# Patient Record
Sex: MALE | Race: WHITE | NOT HISPANIC OR LATINO | Employment: OTHER | URBAN - METROPOLITAN AREA
[De-identification: names, ages, dates, MRNs, and addresses within clinical notes are randomized per-mention and may not be internally consistent; named-entity substitution may affect disease eponyms.]

---

## 2017-02-02 ENCOUNTER — OFFICE VISIT (OUTPATIENT)
Dept: ORTHOPEDIC SURGERY | Facility: CLINIC | Age: 77
End: 2017-02-02

## 2017-02-02 VITALS — WEIGHT: 189.6 LBS | HEIGHT: 69 IN | TEMPERATURE: 97.3 F | BODY MASS INDEX: 28.08 KG/M2

## 2017-02-02 DIAGNOSIS — M79.631 RIGHT FOREARM PAIN: ICD-10-CM

## 2017-02-02 DIAGNOSIS — M25.512 ACUTE PAIN OF LEFT SHOULDER: Primary | ICD-10-CM

## 2017-02-02 PROCEDURE — 73010 X-RAY EXAM OF SHOULDER BLADE: CPT | Performed by: ORTHOPAEDIC SURGERY

## 2017-02-02 PROCEDURE — 73030 X-RAY EXAM OF SHOULDER: CPT | Performed by: ORTHOPAEDIC SURGERY

## 2017-02-02 PROCEDURE — 99203 OFFICE O/P NEW LOW 30 MIN: CPT | Performed by: ORTHOPAEDIC SURGERY

## 2017-02-02 PROCEDURE — 73090 X-RAY EXAM OF FOREARM: CPT | Performed by: ORTHOPAEDIC SURGERY

## 2017-02-02 NOTE — PROGRESS NOTES
New Left Shoulder      Patient: Alfie Montgomery        YOB: 1940    Medical Record Number: [id number]        Chief Complaints: Left Shoulder Pain and Right forearm pain      History of Present Illness: This is a  76 y.o. male who presents with  complaints of left shoulder and right forearm pain.  The right forearm began in September of last year he was digging fence isn't moving concrete and had a pain in the dorsal aspect of his right forearm.  He does not recall one particular lift but it wasn't multiple things.  No night pain it's stiff in the morning though he's tried rest and occasional Mobic.  He is left-hand dominant.  With regard to the left shoulder he fell on January 21 landing on his left shoulder he had a slick area of fell in his had pain since that time he has times where he lives her shoulder gets, and then pops and he can lift that it sounds a lot like either labral tear or biceps tendon tear no prior history of similar symptoms.  He describes his symptoms as mild in the forearm moderate and intermittent in the shoulder stabbing clicking popping he's tried Mobic and rest he is retired her past medical history is remarkable for prostate cancer which is stable          Allergies:   Allergies   Allergen Reactions   • Other        Medications:   Home Medications:  Current Outpatient Prescriptions on File Prior to Visit   Medication Sig   • amLODIPine (NORVASC) 10 MG tablet Take  by mouth.   • chlorthalidone (HYGROTEN) 25 MG tablet Take  by mouth.   • colestipol (COLESTID) 1 G tablet Take  by mouth.   • lisinopril (PRINIVIL,ZESTRIL) 20 MG tablet Take  by mouth 2 (Two) Times a Day.   • meloxicam (MOBIC) 15 MG tablet Take  by mouth.   • raNITIdine (ZANTAC) 150 MG tablet    • simvastatin (ZOCOR) 40 MG tablet    • zolpidem (AMBIEN) 5 MG tablet Take  by mouth.     No current facility-administered medications on file prior to visit.      Current Medications:  Scheduled Meds:  Continuous  "Infusions:  No current facility-administered medications for this visit.   PRN Meds:.    Past Medical History   Diagnosis Date   • Hyperlipidemia    • Hypertension    • Prostate cancer         Past Surgical History   Procedure Laterality Date   • Knee surgery     • Shoulder surgery     • Prostate radioactive seed implant          Social History     Occupational History   • Not on file.     Social History Main Topics   • Smoking status: Never Smoker   • Smokeless tobacco: Not on file   • Alcohol use Yes   • Drug use: Not on file   • Sexual activity: Not on file    Social History     Social History Narrative        Family History   Problem Relation Age of Onset   • Hypertension Mother    • Heart attack Father    • Hypertension Maternal Grandmother              Review of Systems: 14 point review of systems are remarkable for the pertinent positives listed in chart by the patient the remainder are negative    Review of Systems      Physical Exam: 76 y.o. male  General Appearance:    Alert, cooperative, in no acute distress                 Vitals:    02/02/17 1129   Temp: 97.3 °F (36.3 °C)   TempSrc: Temporal Artery    Weight: 189 lb 9.6 oz (86 kg)   Height: 68.5\" (174 cm)      Patient is alert and read ×3 no acute distress appears her above-listed at height weight and age.  Affect is normal respiratory rate is normal unlabored. Heart rate regular rate rhythm, sclera, dentition and hearing are normal for the purpose of this exam.    Ortho Exam Physical exam of the left shoulder reveals no overlying skin changes no lymphedema no lymphadenopathy.  Patient has active flexion 180 with mild symptoms abduction is similar external rotation is to 50 and internal rotation to the upper lumbar spine with mild symptoms.  Patient has good rotator cuff strength 4+ over 5 with isometric strength testing with pain.  Patient has a positive impingement and a positive Alvarez sign.  Patient has good cervical range of motion which is full " "and asymptomatic no radicular symptoms.  Patient has a normal elbow exam.  Good distal pulses are presentPatient has pain with overhead activity and a positive Neer sign and a positive empty can sign  I do get apprehension with stretching/stressing of his biceps but I cannot get it to demonstrate the popping that he describes    Right form exam no effusion no redness he has no overlying skin changes he does have tenderness laterally over his lateral epicondyle  Procedures          Radiology:   AP, Scapular Y and Axillary Lateral of the left shoulder shoulder were ordered/reviewed to evauate shoulder pain.  I have no comparative films.  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 I see no acute bony pathology AP and lateral right forearm were taken he has a little bit of degenerative changes in his elbow these were taken to evaluate his pain have no comparative films I see no acute bony pathology    Assessment/Plan:  Right forearm pain or really think this is lateral epicondylitis I think he benefit from stretching he has a strap at home is given a do that as far as his left shoulder goes I'm concerned about his biceps be in the culprit of the \"popping I would like him to proceed with an MRI which she is agreeable to do he will follow-up after that test                          "

## 2017-02-07 ENCOUNTER — OFFICE VISIT (OUTPATIENT)
Dept: ORTHOPEDIC SURGERY | Facility: CLINIC | Age: 77
End: 2017-02-07

## 2017-02-07 DIAGNOSIS — M25.512 ACUTE PAIN OF LEFT SHOULDER: ICD-10-CM

## 2017-02-07 PROCEDURE — 73221 MRI JOINT UPR EXTREM W/O DYE: CPT | Performed by: ORTHOPAEDIC SURGERY

## 2017-02-10 DIAGNOSIS — M75.122 COMPLETE TEAR OF LEFT ROTATOR CUFF: Primary | ICD-10-CM

## 2017-02-16 ENCOUNTER — OFFICE VISIT (OUTPATIENT)
Dept: ORTHOPEDIC SURGERY | Facility: CLINIC | Age: 77
End: 2017-02-16

## 2017-02-16 VITALS — WEIGHT: 189.4 LBS | HEIGHT: 68 IN | BODY MASS INDEX: 28.7 KG/M2 | TEMPERATURE: 96.5 F

## 2017-02-16 DIAGNOSIS — M75.122 COMPLETE TEAR OF LEFT ROTATOR CUFF: Primary | ICD-10-CM

## 2017-02-16 PROCEDURE — 99213 OFFICE O/P EST LOW 20 MIN: CPT | Performed by: ORTHOPAEDIC SURGERY

## 2017-02-16 RX ORDER — ZOLPIDEM TARTRATE 10 MG/1
TABLET ORAL
COMMUNITY
Start: 2017-02-08 | End: 2017-02-27 | Stop reason: SDUPTHER

## 2017-02-16 NOTE — PROGRESS NOTES
"Left Shoulder MRI Follow Up      Patient: Alfie Montgomery        YOB: 1940            Chief Complaints: Left Shoulder pain  Chief Complaint   Patient presents with   • Left Shoulder - Follow-up           History of Present Illness: The patient is here follow-up of an MRI of the shoulder MRI shows a small full-thickness tear of the rotator cuff and anterior long head of the biceps.  I've spoken with him on the phone or at least he got results he wanted to proceed with surgery so it is on the schedule he is here for preop discussion      Physical Exam: 77 y.o. male  General Appearance:    Alert, cooperative, in no acute distress                   Vitals:    02/16/17 0935   Temp: 96.5 °F (35.8 °C)   TempSrc: Temporal Artery    Weight: 189 lb 6.4 oz (85.9 kg)   Height: 68\" (172.7 cm)        Patient is alert and read ×3 no acute distress appears her above-listed at height weight and age.  Affect is normal respiratory rate is normal unlabored. Heart rate regular rate rhythm, sclera, dentition and hearing are normal for the purpose of this exam.      Ortho Exam  Physical exam of the left shoulder reveals no overlying skin changes no lymphedema no lymphadenopathy.  Patient has active flexion 180 with mild symptoms abduction is similar external rotation is to 50 and internal rotation to the upper lumbar spine with mild symptoms.  Patient has good rotator cuff strength 4+ over 5 with isometric strength testing with pain.  Patient has a positive impingement and a positive Alvarez sign.  Patient has good cervical range of motion which is full and asymptomatic no radicular symptoms.  Patient has a normal elbow exam.  Good distal pulses are presentPatient has pain with overhead activity and a positive Neer sign and a positive empty can sign    Physical Exam: 77 y.o. male  General Appearance:    Alert, cooperative, in no acute distress                      Vitals:    02/16/17 0935   Temp: 96.5 °F (35.8 °C) " "  TempSrc: Temporal Artery    Weight: 189 lb 6.4 oz (85.9 kg)   Height: 68\" (172.7 cm)        Head:    Normocephalic, without obvious abnormality, atraumatic   Eyes:            conjunctivae and sclerae normal, no pallor, corneas clear,    Ears:    Ears appear intact with no abnormalities noted   Throat:   No oral lesions, no thrush, oral mucosa moist   Neck:   No adenopathy, supple, trachea midline, no thyromegaly,    Back:     No kyphosis present, no scoliosis present, no skin lesions,      erythema or scars, no tenderness to percussion or                   palpation,   range of motion normal   Lungs:     Clear to auscultation,respirations regular, even and                  unlabored    Heart:    Regular rhythm and normal rate               Chest Wall:    No abnormalities observed       Rectal:     Deferred   Extremities:    Moves all extremities well, no edema,   no cyanosis, no redness   Pulses:   Pulses palpable and equal bilaterally   Skin:   No bleeding, bruising or rash   Lymph nodes:   No palpable adenopathy   Neurologic:   Appears neurologic intact       MRI Results: As above  Procedures      Assessment/Plan:  Left shoulder pain with a fully extend the rotator cuff.  He is quite adamant he wants to proceed with fixing this.  He is done his research we did discuss risks benefits and alternatives.The patient voiced understanding of the risks, benefits, and alternative forms of treatment that were discussed and the patient consents to proceed with the above listed surgery.  All risks, benefits and alternatives were discussed.  Risks including to but not exclusive to anesthetic complications, including death, MI, CVA, infection, bleeding DVT, fracture, residual pain and need for future surgery.    He understands all these and wishes to proceed.  He is actually on the schedule he understands his perioperative regimen                "

## 2017-02-27 ENCOUNTER — APPOINTMENT (OUTPATIENT)
Dept: PREADMISSION TESTING | Facility: HOSPITAL | Age: 77
End: 2017-02-27

## 2017-02-27 VITALS
WEIGHT: 191 LBS | OXYGEN SATURATION: 97 % | RESPIRATION RATE: 16 BRPM | HEIGHT: 68 IN | TEMPERATURE: 97.1 F | HEART RATE: 52 BPM | SYSTOLIC BLOOD PRESSURE: 124 MMHG | BODY MASS INDEX: 28.95 KG/M2 | DIASTOLIC BLOOD PRESSURE: 71 MMHG

## 2017-02-27 LAB
ANION GAP SERPL CALCULATED.3IONS-SCNC: 13.3 MMOL/L
BUN BLD-MCNC: 26 MG/DL (ref 8–23)
BUN/CREAT SERPL: 22.2 (ref 7–25)
CALCIUM SPEC-SCNC: 8.9 MG/DL (ref 8.6–10.5)
CHLORIDE SERPL-SCNC: 100 MMOL/L (ref 98–107)
CO2 SERPL-SCNC: 25.7 MMOL/L (ref 22–29)
CREAT BLD-MCNC: 1.17 MG/DL (ref 0.76–1.27)
DEPRECATED RDW RBC AUTO: 44.2 FL (ref 37–54)
ERYTHROCYTE [DISTWIDTH] IN BLOOD BY AUTOMATED COUNT: 13.4 % (ref 11.5–14.5)
GFR SERPL CREATININE-BSD FRML MDRD: 61 ML/MIN/1.73
GLUCOSE BLD-MCNC: 113 MG/DL (ref 65–99)
HCT VFR BLD AUTO: 37.9 % (ref 40.4–52.2)
HGB BLD-MCNC: 13.2 G/DL (ref 13.7–17.6)
MCH RBC QN AUTO: 31.7 PG (ref 27–32.7)
MCHC RBC AUTO-ENTMCNC: 34.8 G/DL (ref 32.6–36.4)
MCV RBC AUTO: 90.9 FL (ref 79.8–96.2)
PLATELET # BLD AUTO: 232 10*3/MM3 (ref 140–500)
PMV BLD AUTO: 9.9 FL (ref 6–12)
POTASSIUM BLD-SCNC: 4 MMOL/L (ref 3.5–5.2)
RBC # BLD AUTO: 4.17 10*6/MM3 (ref 4.6–6)
SODIUM BLD-SCNC: 139 MMOL/L (ref 136–145)
WBC NRBC COR # BLD: 4.34 10*3/MM3 (ref 4.5–10.7)

## 2017-02-27 PROCEDURE — 36415 COLL VENOUS BLD VENIPUNCTURE: CPT

## 2017-02-27 PROCEDURE — 85027 COMPLETE CBC AUTOMATED: CPT | Performed by: ORTHOPAEDIC SURGERY

## 2017-02-27 PROCEDURE — 80048 BASIC METABOLIC PNL TOTAL CA: CPT | Performed by: ORTHOPAEDIC SURGERY

## 2017-02-27 RX ORDER — ALOSETRON HYDROCHLORIDE 0.5 MG/1
0.5 TABLET, FILM COATED ORAL 2 TIMES DAILY
COMMUNITY

## 2017-03-06 ENCOUNTER — ANESTHESIA EVENT (OUTPATIENT)
Dept: PERIOP | Facility: HOSPITAL | Age: 77
End: 2017-03-06

## 2017-03-06 ENCOUNTER — HOSPITAL ENCOUNTER (OUTPATIENT)
Facility: HOSPITAL | Age: 77
Setting detail: HOSPITAL OUTPATIENT SURGERY
Discharge: HOME | End: 2017-03-06
Attending: ORTHOPAEDIC SURGERY | Admitting: ORTHOPAEDIC SURGERY

## 2017-03-06 ENCOUNTER — ANESTHESIA (OUTPATIENT)
Dept: PERIOP | Facility: HOSPITAL | Age: 77
End: 2017-03-06

## 2017-03-06 VITALS
DIASTOLIC BLOOD PRESSURE: 77 MMHG | RESPIRATION RATE: 16 BRPM | SYSTOLIC BLOOD PRESSURE: 133 MMHG | OXYGEN SATURATION: 95 % | TEMPERATURE: 98 F | HEART RATE: 61 BPM

## 2017-03-06 PROCEDURE — 25010000002 FENTANYL CITRATE (PF) 100 MCG/2ML SOLUTION: Performed by: ANESTHESIOLOGY

## 2017-03-06 PROCEDURE — 25010000002 DEXAMETHASONE PER 1 MG: Performed by: NURSE ANESTHETIST, CERTIFIED REGISTERED

## 2017-03-06 PROCEDURE — 25010000002 MIDAZOLAM PER 1 MG: Performed by: ANESTHESIOLOGY

## 2017-03-06 PROCEDURE — 25010000002 ROPIVACAINE PER 1 MG: Performed by: ANESTHESIOLOGY

## 2017-03-06 PROCEDURE — 25010000003 CEFAZOLIN IN DEXTROSE 2-4 GM/100ML-% SOLUTION: Performed by: ORTHOPAEDIC SURGERY

## 2017-03-06 PROCEDURE — 25010000002 ONDANSETRON PER 1 MG: Performed by: NURSE ANESTHETIST, CERTIFIED REGISTERED

## 2017-03-06 PROCEDURE — C1713 ANCHOR/SCREW BN/BN,TIS/BN: HCPCS | Performed by: ORTHOPAEDIC SURGERY

## 2017-03-06 PROCEDURE — 29822 SHO ARTHRS SRG LMTD DBRDMT: CPT | Performed by: ORTHOPAEDIC SURGERY

## 2017-03-06 PROCEDURE — 23410 REPAIR ROTATOR CUFF ACUTE: CPT | Performed by: ORTHOPAEDIC SURGERY

## 2017-03-06 PROCEDURE — 25010000002 EPINEPHRINE PER 0.1 MG: Performed by: ORTHOPAEDIC SURGERY

## 2017-03-06 PROCEDURE — 25010000002 PROPOFOL 10 MG/ML EMULSION: Performed by: NURSE ANESTHETIST, CERTIFIED REGISTERED

## 2017-03-06 RX ORDER — MIDAZOLAM HYDROCHLORIDE 1 MG/ML
1 INJECTION INTRAMUSCULAR; INTRAVENOUS
Status: DISCONTINUED | OUTPATIENT
Start: 2017-03-06 | End: 2017-03-06 | Stop reason: HOSPADM

## 2017-03-06 RX ORDER — SODIUM CHLORIDE, SODIUM LACTATE, POTASSIUM CHLORIDE, CALCIUM CHLORIDE 600; 310; 30; 20 MG/100ML; MG/100ML; MG/100ML; MG/100ML
9 INJECTION, SOLUTION INTRAVENOUS CONTINUOUS
Status: DISCONTINUED | OUTPATIENT
Start: 2017-03-06 | End: 2017-03-06 | Stop reason: HOSPADM

## 2017-03-06 RX ORDER — LIDOCAINE HYDROCHLORIDE 10 MG/ML
5 INJECTION, SOLUTION EPIDURAL; INFILTRATION; INTRACAUDAL; PERINEURAL ONCE
Status: DISCONTINUED | OUTPATIENT
Start: 2017-03-06 | End: 2017-03-06 | Stop reason: HOSPADM

## 2017-03-06 RX ORDER — FENTANYL CITRATE 50 UG/ML
50 INJECTION, SOLUTION INTRAMUSCULAR; INTRAVENOUS
Status: DISCONTINUED | OUTPATIENT
Start: 2017-03-06 | End: 2017-03-06 | Stop reason: HOSPADM

## 2017-03-06 RX ORDER — GLYCOPYRROLATE 0.2 MG/ML
INJECTION INTRAMUSCULAR; INTRAVENOUS AS NEEDED
Status: DISCONTINUED | OUTPATIENT
Start: 2017-03-06 | End: 2017-03-06 | Stop reason: SURG

## 2017-03-06 RX ORDER — HYDROCODONE BITARTRATE AND ACETAMINOPHEN 5; 325 MG/1; MG/1
1 TABLET ORAL ONCE AS NEEDED
Status: DISCONTINUED | OUTPATIENT
Start: 2017-03-06 | End: 2017-03-06 | Stop reason: HOSPADM

## 2017-03-06 RX ORDER — SODIUM CHLORIDE 0.9 % (FLUSH) 0.9 %
1-10 SYRINGE (ML) INJECTION AS NEEDED
Status: DISCONTINUED | OUTPATIENT
Start: 2017-03-06 | End: 2017-03-06 | Stop reason: HOSPADM

## 2017-03-06 RX ORDER — ONDANSETRON 4 MG/1
4 TABLET, FILM COATED ORAL EVERY 8 HOURS PRN
Qty: 20 TABLET | Refills: 0 | Status: SHIPPED | OUTPATIENT
Start: 2017-03-06 | End: 2017-05-22

## 2017-03-06 RX ORDER — LABETALOL HYDROCHLORIDE 5 MG/ML
5 INJECTION, SOLUTION INTRAVENOUS
Status: DISCONTINUED | OUTPATIENT
Start: 2017-03-06 | End: 2017-03-06 | Stop reason: HOSPADM

## 2017-03-06 RX ORDER — OXYCODONE AND ACETAMINOPHEN 7.5; 325 MG/1; MG/1
TABLET ORAL
Qty: 60 TABLET | Refills: 0 | Status: SHIPPED | OUTPATIENT
Start: 2017-03-06 | End: 2017-03-17 | Stop reason: SDUPTHER

## 2017-03-06 RX ORDER — CEFAZOLIN SODIUM 2 G/100ML
2 INJECTION, SOLUTION INTRAVENOUS ONCE
Status: COMPLETED | OUTPATIENT
Start: 2017-03-06 | End: 2017-03-06

## 2017-03-06 RX ORDER — DIPHENHYDRAMINE HYDROCHLORIDE 50 MG/ML
6.25 INJECTION INTRAMUSCULAR; INTRAVENOUS
Status: DISCONTINUED | OUTPATIENT
Start: 2017-03-06 | End: 2017-03-06 | Stop reason: HOSPADM

## 2017-03-06 RX ORDER — LIDOCAINE HYDROCHLORIDE 20 MG/ML
INJECTION, SOLUTION INFILTRATION; PERINEURAL AS NEEDED
Status: DISCONTINUED | OUTPATIENT
Start: 2017-03-06 | End: 2017-03-06 | Stop reason: SURG

## 2017-03-06 RX ORDER — ONDANSETRON 2 MG/ML
INJECTION INTRAMUSCULAR; INTRAVENOUS AS NEEDED
Status: DISCONTINUED | OUTPATIENT
Start: 2017-03-06 | End: 2017-03-06 | Stop reason: SURG

## 2017-03-06 RX ORDER — ROPIVACAINE HYDROCHLORIDE 2 MG/ML
INJECTION, SOLUTION EPIDURAL; INFILTRATION; PERINEURAL AS NEEDED
Status: DISCONTINUED | OUTPATIENT
Start: 2017-03-06 | End: 2017-03-06 | Stop reason: SURG

## 2017-03-06 RX ORDER — DEXAMETHASONE SODIUM PHOSPHATE 10 MG/ML
INJECTION INTRAMUSCULAR; INTRAVENOUS AS NEEDED
Status: DISCONTINUED | OUTPATIENT
Start: 2017-03-06 | End: 2017-03-06 | Stop reason: SURG

## 2017-03-06 RX ORDER — ACETAMINOPHEN 325 MG/1
650 TABLET ORAL ONCE
Status: COMPLETED | OUTPATIENT
Start: 2017-03-06 | End: 2017-03-06

## 2017-03-06 RX ORDER — PROPOFOL 10 MG/ML
VIAL (ML) INTRAVENOUS AS NEEDED
Status: DISCONTINUED | OUTPATIENT
Start: 2017-03-06 | End: 2017-03-06 | Stop reason: SURG

## 2017-03-06 RX ORDER — HYDRALAZINE HYDROCHLORIDE 20 MG/ML
5 INJECTION INTRAMUSCULAR; INTRAVENOUS
Status: DISCONTINUED | OUTPATIENT
Start: 2017-03-06 | End: 2017-03-06 | Stop reason: HOSPADM

## 2017-03-06 RX ORDER — OXYCODONE HYDROCHLORIDE AND ACETAMINOPHEN 5; 325 MG/1; MG/1
2 TABLET ORAL ONCE AS NEEDED
Status: DISCONTINUED | OUTPATIENT
Start: 2017-03-06 | End: 2017-03-06 | Stop reason: HOSPADM

## 2017-03-06 RX ORDER — FAMOTIDINE 10 MG/ML
20 INJECTION, SOLUTION INTRAVENOUS ONCE
Status: COMPLETED | OUTPATIENT
Start: 2017-03-06 | End: 2017-03-06

## 2017-03-06 RX ORDER — ONDANSETRON 2 MG/ML
4 INJECTION INTRAMUSCULAR; INTRAVENOUS ONCE AS NEEDED
Status: DISCONTINUED | OUTPATIENT
Start: 2017-03-06 | End: 2017-03-06 | Stop reason: HOSPADM

## 2017-03-06 RX ORDER — MIDAZOLAM HYDROCHLORIDE 1 MG/ML
2 INJECTION INTRAMUSCULAR; INTRAVENOUS
Status: DISCONTINUED | OUTPATIENT
Start: 2017-03-06 | End: 2017-03-06 | Stop reason: HOSPADM

## 2017-03-06 RX ADMIN — ONDANSETRON 4 MG: 2 INJECTION INTRAMUSCULAR; INTRAVENOUS at 09:43

## 2017-03-06 RX ADMIN — ACETAMINOPHEN 325 MG: 325 TABLET ORAL at 06:48

## 2017-03-06 RX ADMIN — SODIUM CHLORIDE, POTASSIUM CHLORIDE, SODIUM LACTATE AND CALCIUM CHLORIDE 9 ML/HR: 600; 310; 30; 20 INJECTION, SOLUTION INTRAVENOUS at 06:28

## 2017-03-06 RX ADMIN — GLYCOPYRROLATE 0.2 MG: 0.2 INJECTION INTRAMUSCULAR; INTRAVENOUS at 08:25

## 2017-03-06 RX ADMIN — ROPIVACAINE HYDROCHLORIDE 20 ML: 2 INJECTION, SOLUTION EPIDURAL; INFILTRATION at 07:37

## 2017-03-06 RX ADMIN — FENTANYL CITRATE 25 MCG: 50 INJECTION INTRAMUSCULAR; INTRAVENOUS at 06:52

## 2017-03-06 RX ADMIN — CEFAZOLIN SODIUM 2 G: 2 INJECTION, SOLUTION INTRAVENOUS at 08:20

## 2017-03-06 RX ADMIN — PROPOFOL 200 MG: 10 INJECTION, EMULSION INTRAVENOUS at 08:19

## 2017-03-06 RX ADMIN — EPHEDRINE SULFATE 10 MG: 50 INJECTION INTRAMUSCULAR; INTRAVENOUS; SUBCUTANEOUS at 08:52

## 2017-03-06 RX ADMIN — FAMOTIDINE 20 MG: 10 INJECTION, SOLUTION INTRAVENOUS at 06:56

## 2017-03-06 RX ADMIN — DEXAMETHASONE SODIUM PHOSPHATE 8 MG: 10 INJECTION INTRAMUSCULAR; INTRAVENOUS at 08:35

## 2017-03-06 RX ADMIN — LIDOCAINE HYDROCHLORIDE 80 MG: 20 INJECTION, SOLUTION INFILTRATION; PERINEURAL at 08:19

## 2017-03-06 RX ADMIN — ROPIVACAINE HYDROCHLORIDE 6 ML/HR: 2 INJECTION, SOLUTION EPIDURAL; INFILTRATION at 10:30

## 2017-03-06 RX ADMIN — MIDAZOLAM 2 MG: 1 INJECTION INTRAMUSCULAR; INTRAVENOUS at 06:52

## 2017-03-06 NOTE — H&P
History & Physical       Patient: Alfie Montgomery    Date of Admission: No admission date for patient encounter.    YOB: 1940    Medical Record Number: [id number]    Attending Physician: Isela Bernardo MD        Chief Complaints: Complete tear of left rotator cuff [M75.122]      History of Present Illness: This is a 77-year-old gentleman who had a fall in January and sustained a rotator cuff tear.  His MRI shows a tear as well as a torn biceps tendon complex labral tear and some degenerative changes seen at the glenohumeral and acromioclavicular joint.  He presents for arthroscopy possible rotator cuff repair     Allergies: No Known Allergies    Medications:   Home Medications:  No current facility-administered medications on file prior to encounter.      Current Outpatient Prescriptions on File Prior to Encounter   Medication Sig   • amLODIPine (NORVASC) 10 MG tablet Take 10 mg by mouth Every Evening.   • chlorthalidone (HYGROTEN) 25 MG tablet Take 25 mg by mouth Daily.   • colestipol (COLESTID) 1 G tablet Take 1 g by mouth 3 (Three) Times a Day.   • lisinopril (PRINIVIL,ZESTRIL) 20 MG tablet Take 20 mg by mouth 2 (Two) Times a Day.   • meloxicam (MOBIC) 15 MG tablet Take 15 mg by mouth Daily. HOLD PRIOR TO SURGERY   • raNITIdine (ZANTAC) 150 MG tablet Take 150 mg by mouth 2 (Two) Times a Day.   • simvastatin (ZOCOR) 40 MG tablet Take 40 mg by mouth Every Night.   • zolpidem (AMBIEN) 5 MG tablet Take 5 mg by mouth At Night As Needed.     Current Medications:  Scheduled Meds:  Continuous Infusions:  No current facility-administered medications for this encounter.   PRN Meds:.    Past Medical History   Diagnosis Date   • Acid reflux    • Hyperlipidemia    • Hypertension    • IBS (irritable bowel syndrome)    • Prostate cancer      HISTORY        Past Surgical History   Procedure Laterality Date   • Shoulder surgery     • Prostate radioactive seed implant     • Knee arthroplasty Right    • Knee  arthroplasty Left    • Knee arthroscopy Left    • Knee arthroscopy Right    • Shoulder arthroscopy Right      CALCIUM DEPOSIT REMOVED   • Tonsillectomy     • Cataract extraction w/ intraocular lens implant Bilateral         Social History     Occupational History   • Not on file.     Social History Main Topics   • Smoking status: Current Every Day Smoker     Years: 2.00     Types: Pipe   • Smokeless tobacco: Not on file      Comment: DURING COLLEGE YEARS   • Alcohol use 3.6 oz/week     4 Cans of beer, 2 Glasses of wine per week   • Drug use: No   • Sexual activity: Defer    Social History     Social History Narrative        Family History   Problem Relation Age of Onset   • Hypertension Mother    • Heart attack Father    • Hypertension Maternal Grandmother        Review of Systems      Physical Exam: 77 y.o. male  General Appearance:    Alert, cooperative, in no acute distress                    There were no vitals filed for this visit.     Head:    Normocephalic, without obvious abnormality, atraumatic   Eyes:            conjunctivae and sclerae normal, no pallor, corneas clear,    Ears:    Ears appear intact with no abnormalities noted   Throat:   No oral lesions, no thrush, oral mucosa moist   Neck:   No adenopathy, supple, trachea midline, no thyromegaly,    Back:     No kyphosis present, no scoliosis present, no skin lesions,      erythema or scars, no tenderness to percussion or                   palpation,   range of motion normal   Lungs:     Clear to auscultation,respirations regular, even and                  unlabored    Heart:    Regular rhythm and normal rate               Chest Wall:    No abnormalities observed   Abdomen:     Normal bowel sounds, no masses, no organomegaly, soft        non-tender, non-distended, no guarding, no rebound                tenderness   Rectal:     Deferred   Extremities:    Moves all extremities well, no edema,   no cyanosis, no redness   Pulses:   Pulses palpable and  equal bilaterally   Skin:   No bleeding, bruising or rash   Lymph nodes:   No palpable adenopathy   Neurologic:   Appears neurologic intact             Assessment:  Patient Active Problem List   Diagnosis   • Hyperlipidemia   • Hypertension   • Dyspnea on exertion   • Acute pain of left shoulder   • Right forearm pain    rotator cuff tear  Labral tear        Plan: All risks, benefits and alternatives were discussed.  Risks including to but not exclusive to anesthetic complications, including death, MI, CVA, infection, bleeding DVT, PE,  fracture, residual pain and need for future surgery.  Patient understood all and agrees to proceed.

## 2017-03-06 NOTE — PLAN OF CARE
Problem: Patient Care Overview (Adult)  Goal: Plan of Care Review  Outcome: Ongoing (interventions implemented as appropriate)    03/06/17 1118   Coping/Psychosocial Response Interventions   Plan Of Care Reviewed With patient   Patient Care Overview   Progress no change       Goal: Adult Individualization and Mutuality  Outcome: Ongoing (interventions implemented as appropriate)  Goal: Discharge Needs Assessment  Outcome: Ongoing (interventions implemented as appropriate)    Problem: Perioperative Period (Adult)  Goal: Signs and Symptoms of Listed Potential Problems Will be Absent or Manageable (Perioperative Period)  Outcome: Ongoing (interventions implemented as appropriate)

## 2017-03-06 NOTE — OP NOTE
Shoulder Scope Decompression Operative Note      Facility: [facility]  Patient Name: Alfie Montgomery  YOB: 1940  Date: 3/6/2017  Medical Record Number: [id number]      Pre-op Diagnosis: Left shoulder rotator cuff tear, arthritis and labral tear      Post-op Diagnosis:   Complex tear of the glenoid labrum, degenerative changes grade 3 and 4, complete biceps tendon tear, large rotator cuff tear        Procedure(s):  LEFT SHOULDER ARTHROSCOPY WITH MINI OPEN PARTIAL ROTATOR CUFF REPAIR DEBRIDEMENT OF LABRUM, DEBRIDEMENT OF ARTHRITIS, DECOMPRESSION    Surgeon(s):  Isela Bernardo MD    Anesthesia: General  Anesthesiologist: Sav Ma MD  CRNA: Marjan Hutson CRNA    Staff:   Circulator: Andria Velasquez RN  Scrub Person: Freeman Candelario  Vendor Representative: Khris Patel  Assistants :       Estimated Blood Loss: Less than 200 cc     Drains: None    Findings: See Dictation    Complications: None    Indication for procedure:     This patient has had a several month history of left shoulder pain which is been unresponsive to conservative management. They have an MRI and an exam which are consistent with impingement and a present for arthroscopy.  He also has significant arthritis he understands this and wishes to proceed They understand all risks benefits and alternatives. Risk including but not exclusive to anesthetic complications including death MI CVA as well as infection bleeding, failure to relieve symptoms and need for future surgery. They understand these and agree to proceed.      Description of procedure:       Patient was taken to the operating room. They were placed supine on the operating room table. After induction of adequate LMA anesthesia, scalene nerve block and IV antibiotics, they underwent exam under anesthesia was demonstrated  full range of motion which was symmetric side to side. They were placed in the modified beachchair position all prominent areas  well padded and head well stabilized. The arm was prepped and draped in the usual sterile fashion. Bony landmarks were demarcated and the joint was infiltrated with 30 cc of fluid. Standard posterior portal was made inferior and medial to the posterior H acromion with a 11 blade. Blunt trocar penetrated into the joint scope following in our evaluation began.  The labrum had a very large tear nearly obliterating my view of the glenohumeral joint.  I established an anterior portal and her in the interval between the subscapularis and the biceps tendon with spinal needle localization direct visualization.  Debridement of labrum with a motorized shaver.  He was found to have grade 4 changes on the glenoid labrum on the most anterior aspect and diffuse grade 3 and 4 changes on the humeral head.  Both of these were debrided with a motorized shaver.  He had a complete biceps tendon tear and a large retracted rotator cuff tear.       I then exited the space entered the subacromial space. I made accessory lateral portal off the anterior lateral edge of the acromion placed the shaver and removed marked amount of thickened bursa. I delineated the anterior lateral edge of the acromion with the Opus device and resected the large spur that was present.  The rotator cuff was abnormal and retracted time.  It was interesting, the majority of the tear was within the tendon and several side-to-side sutures could be done posteriorly.  Anteriorly the tear was retracted and I could not mobilize it.  It this point I exited the space extended my lateral portal spread the deltoid incises subdeltoid fascia placed the cobra retractors and readily identified humeral head.  I did take a little bit of the deltoid off of the most anterior lateral aspect and freed the rotator cuff above and below.  Posteriorly, it was quite mobile and I could do several side-to-side sutures utilizing free needles in the scorpion device.  I could not mobilize the  most anterior aspect of the cuff.  I feel like we repaired approximate 75% of the rotator cuff at this point, Everything was thoroughly irrigated, it was suctioned, the portals were closed with 0 Vicryl, the deltoid is reattached with 1 FiberWire and 0 Vicryl the remainder of the deltoid split was reapproximated with 0 Vicryl is 17 is tissue with 2-0 Vicryl and the skin closed with a running 4-0 subcuticular stitch.  Steri-Strips were applied. Sterile dressings and a sling were applied. The patient tolerated this well, was taken to recovery room in good condition all sponge and needle count were correct.      Date: 3/6/2017  Time: 10:10 AM

## 2017-03-06 NOTE — ANESTHESIA PROCEDURE NOTES
Peripheral Block    Patient location during procedure: pre-op  Start time: 3/6/2017 7:29 AM  Stop time: 3/6/2017 7:37 AM  Reason for block: at surgeon's request and post-op pain management  Performed by  Anesthesiologist: YOSELYN CASTRO  Preanesthetic Checklist  Completed: patient identified, site marked, surgical consent, pre-op evaluation, timeout performed, IV checked, risks and benefits discussed and monitors and equipment checked  Peripheral Block Prep:  Sterile barriers:gloves  Prep: ChloraPrep  Patient monitoring: blood pressure monitoring, continuous pulse oximetry and EKG  Peripheral Procedure  Sedation:yes  Guidance:ultrasound guided  Images:still images obtained    Laterality:left  Block Type:interscalene  Injection Technique:catheter  Needle Type:short-bevel  Needle Gauge:22 G  ULTRASOUND INTERPRETATION.  Using ultrasound guidance a 22 G gauge needle was placed in close proximity to the brachial plexus nerve, at which point, under ultrasound guidance anesthetic was injected in the area of the nerve and spread of the anesthesia was seen on ultrasound in close proximity thereto.  There were no abnormalities seen on ultrasound; a digital image was taken; and the patient tolerated the procedure with no complications.   Medications  Analgesia: Dexamethasone 4mg.  Comment:Dexamethasone 4mg added to injectate  Local Injected:ropivacaine 0.2% Local Amount Injected:20mL  Post Assessment  Injection Assessment: negative aspiration for heme, no paresthesia on injection and incremental injection  Patient Tolerance:comfortable throughout block  Complications:no

## 2017-03-06 NOTE — DISCHARGE INSTRUCTIONS
What to expect after a Nerve Block    Nerve blocks administered to block pain affect many types of nerves, including those nerves that control movement, pain, and normal sensation. Following a nerve block, you may notice some bruising at the site where the block was given. You may experience sensations such as: numbness of the affected area or limb, tingling, heaviness (that is the limb feels heavy to you), weakness or inability to move the affected arm or leg, or a feeling as if your arm or leg has “fallen asleep.”     A nerve block can last from 2 to 36 hours depending on the medications used.  Usually the weakness wears off first followed by the tingling and heaviness. As the block wears off, you may begin to notice pain; however, this sequence of events may occur in any order. Typically, you will be able to move your limb before you will feel it. Once a nerve block begins to wear off, the effects are usually completely gone within 60 minutes.  If you experience continued side effects that you believe are block related for longer than 48 hours, please call your healthcare provider. Please see block-specific instructions below.    Instructions for any block involving the shoulder or arm  • If you have had any kind of shoulder/arm block, you will go home with your arm in a sling. Wear the sling until the block has completely worn off. You may be required to wear it for a longer period of time per your surgeon’s recommendations.  • If you have had a shoulder/arm block, it is a good idea to sleep on a recliner with pillows under your arm.    You may experience symptoms such as:  Shortness of breath  Hoarseness   Blurry vision  Unequal pupils  Drooping of your face on the same side as the block was performed    These are side effects associated with this kind of block and should go away within 12 hours.    Note: If you have severe or prolonged shortness of breath, please seek medical assistance as soon as  possible.     Protection of a “blocked” arm or leg (limb)  • After a nerve block, you cannot feel pain, pressure, or extremes of temperature in the affected limb. And because of this, your blocked limb is at more risk for injury. For example, it is possible to burn your limb on an extremely hot surface without feeling it.     • When resting, it is important to reposition your limb periodically to avoid prolonged pressure on it. This may require the use of pillows and padding.    • While sleeping, you should avoid rolling onto the affected limb or putting too much pressure on it.     • If you have a cast or tight dressing, check the color of your fingers or toes of the affected limb. Call your surgeon if they look discolored (that is, dusky, dark colored).    • Use caution in cold weather. Cover your limb appropriately to protect it from the cold.      Pain Management:    Your surgeon will give you a prescription for pain medication. Begin taking this before the nerve block wears off. Bear in mind that sometimes the block can wear off in the middle of the night.

## 2017-03-06 NOTE — SIGNIFICANT NOTE
Attempted to notify wife of start did not answer phone. Chloé Hayden RN was notified to tell wife of start

## 2017-03-06 NOTE — ANESTHESIA POSTPROCEDURE EVALUATION
Patient: Alfie Montgomery    Procedure Summary     Date Anesthesia Start Anesthesia Stop Room / Location    03/06/17 0811 1006  [facility]  /  [facility]       Procedure Diagnosis Surgeon Provider    LEFT SHOULDER ARTHROSCOPY WITH MINI OPEN PARTIAL ROTATOR CUFF REPAIR DEBRIDEMENT OF LABRUM, DEBRIDEMENT OF ARTHRITIS, DECOMPRESSION (Left Shoulder) Complete tear of left rotator cuff  (Complete tear of left rotator cuff [M75.122]) MD Sav Sutton MD          Anesthesia Type: general, regional  Last vitals  /67 (03/06/17 1130)    Temp 36.6 °C (97.8 °F) (03/06/17 1130)    Pulse 57 (03/06/17 1130)   Resp 16 (03/06/17 1130)    SpO2 94 % (03/06/17 1115)      Post Anesthesia Care and Evaluation    Patient location during evaluation: bedside  Patient participation: complete - patient participated  Level of consciousness: awake and alert  Pain management: adequate  Airway patency: patent  Anesthetic complications: No anesthetic complications    Cardiovascular status: acceptable  Respiratory status: acceptable  Hydration status: acceptable    Comments: Awake, no pain

## 2017-03-06 NOTE — ANESTHESIA PREPROCEDURE EVALUATION
Anesthesia Evaluation        Airway   Mallampati: II  TM distance: >3 FB  Neck ROM: full  no difficulty expected  Dental - normal exam     Pulmonary - normal exam   (+) shortness of breath,   Cardiovascular   Exercise tolerance: good (4-7 METS)    ECG reviewed  Rhythm: regular    (+) hypertension,   (-) murmur, carotid bruits      Neuro/Psych  GI/Hepatic/Renal/Endo    (+)  GERD,     Musculoskeletal     (+) arthralgias,   Abdominal  - normal exam   Substance History      OB/GYN          Other                                    Anesthesia Plan    ASA 2     general and regional   (  D/W R&B of GA including but not limited to: heart, lung, liver, kidney, neurologic problems, positioning injuries, dental damage, corneal abrasion and TMJ.  .Risks of peripheral nerve block were discussed with patient including but not limited to: inadequate block, bleeding, infection, persistent numbness or weakness, nerve damage, painful dysasthesia and need to protect limb while numb.)

## 2017-03-06 NOTE — PLAN OF CARE
Problem: Patient Care Overview (Adult)  Goal: Plan of Care Review  Outcome: Ongoing (interventions implemented as appropriate)    03/06/17 0615   Coping/Psychosocial Response Interventions   Plan Of Care Reviewed With patient   Patient Care Overview   Progress no change       Goal: Discharge Needs Assessment  Outcome: Ongoing (interventions implemented as appropriate)    03/06/17 0615   Discharge Needs Assessment   Concerns To Be Addressed denies needs/concerns at this time         Problem: Perioperative Period (Adult)  Goal: Signs and Symptoms of Listed Potential Problems Will be Absent or Manageable (Perioperative Period)    03/06/17 0615   Perioperative Period   Problems Assessed (Perioperative Period) all   Problems Present (Perioperative Period) pain

## 2017-03-17 ENCOUNTER — OFFICE VISIT (OUTPATIENT)
Dept: ORTHOPEDIC SURGERY | Facility: CLINIC | Age: 77
End: 2017-03-17

## 2017-03-17 VITALS — HEIGHT: 68 IN | WEIGHT: 191 LBS | BODY MASS INDEX: 28.95 KG/M2

## 2017-03-17 DIAGNOSIS — Z98.890 S/P ROTATOR CUFF REPAIR: Primary | ICD-10-CM

## 2017-03-17 PROCEDURE — 99024 POSTOP FOLLOW-UP VISIT: CPT | Performed by: ORTHOPAEDIC SURGERY

## 2017-03-17 RX ORDER — OXYCODONE AND ACETAMINOPHEN 7.5; 325 MG/1; MG/1
TABLET ORAL
Qty: 60 TABLET | Refills: 0 | Status: SHIPPED | OUTPATIENT
Start: 2017-03-17 | End: 2017-05-22

## 2017-03-17 NOTE — PROGRESS NOTES
"Left Shoulder Scope RCR follow Up 1st Visit      Patient: Alfie Montgomery        YOB: 1940      Chief Complaints: Left shoulder pain  Chief Complaint   Patient presents with   • Left Shoulder - Post-op           History of Present Illness: Pt is here f/u shoulder arthroscopy he states he is doing well.  We talked about restrictions he is weaned essentially office pain medicine he takes a half in the morning and a half at night        Allergies: No Known Allergies    Medications:   Home Medications:  Current Outpatient Prescriptions on File Prior to Visit   Medication Sig   • alosetron (LOTRONEX) 0.5 MG tablet Take 0.5 mg by mouth 2 (Two) Times a Day.   • amLODIPine (NORVASC) 10 MG tablet Take 10 mg by mouth Every Evening.   • chlorthalidone (HYGROTEN) 25 MG tablet Take 25 mg by mouth Daily.   • colestipol (COLESTID) 1 G tablet Take 1 g by mouth 3 (Three) Times a Day.   • lisinopril (PRINIVIL,ZESTRIL) 20 MG tablet Take 20 mg by mouth 2 (Two) Times a Day.   • meloxicam (MOBIC) 15 MG tablet Take 15 mg by mouth Daily. HOLD PRIOR TO SURGERY   • oxyCODONE-acetaminophen (PERCOCET) 7.5-325 MG per tablet  1-2 Oral Q4H PRN severe pain   • raNITIdine (ZANTAC) 150 MG tablet Take 150 mg by mouth 2 (Two) Times a Day.   • simvastatin (ZOCOR) 40 MG tablet Take 40 mg by mouth Every Night.   • zolpidem (AMBIEN) 5 MG tablet Take 5 mg by mouth At Night As Needed.   • ondansetron (ZOFRAN) 4 MG tablet Take 1 tablet by mouth Every 8 (Eight) Hours As Needed for nausea or vomiting.     No current facility-administered medications on file prior to visit.      Current Medications:  Scheduled Meds:  Continuous Infusions:  No current facility-administered medications for this visit.   PRN Meds:.          Physical Exam: 77 y.o. male  General Appearance:    Alert, cooperative, in no acute distress                 Vitals:    03/17/17 1000   Weight: 191 lb (86.6 kg)   Height: 68\" (172.7 cm)      Patient is alert and oriented ×3 no " acute distress normal mood physical exam.  Physical exam of the shoulder, incisions looked good there is no erythema,no signs or sx of infection.        Assessment  S/P shoulder scope.  I did review intraoperative findings and arthroscopic pictures with the patient.  We removed his sutures I reiterated his restrictions I will start him into physical therapy.  I will give him another prescription for pain medicine start him into therapy and I'll see him back in 4 weeks

## 2017-04-14 ENCOUNTER — OFFICE VISIT (OUTPATIENT)
Dept: ORTHOPEDIC SURGERY | Facility: CLINIC | Age: 77
End: 2017-04-14

## 2017-04-14 VITALS — BODY MASS INDEX: 27.58 KG/M2 | WEIGHT: 182 LBS | HEIGHT: 68 IN | TEMPERATURE: 97.5 F

## 2017-04-14 DIAGNOSIS — Z98.890 S/P ROTATOR CUFF REPAIR: Primary | ICD-10-CM

## 2017-04-14 PROCEDURE — 99024 POSTOP FOLLOW-UP VISIT: CPT | Performed by: ORTHOPAEDIC SURGERY

## 2017-04-14 RX ORDER — HYDROCODONE BITARTRATE AND ACETAMINOPHEN 5; 325 MG/1; MG/1
1 TABLET ORAL EVERY 8 HOURS PRN
Qty: 45 TABLET | Refills: 0 | Status: SHIPPED | OUTPATIENT
Start: 2017-04-14 | End: 2017-05-22

## 2017-04-14 NOTE — PROGRESS NOTES
"Shoulder Scope Follow Up       Patient: Alfie Montgomery        YOB: 1940      Chief Complaints: Left shoulder pain      History of Present Illness: Pt is here f/u shoulder arthroscopy  Rcr, he is doing great progressing with physical therapy taking only half to 1 hydrocodone at night and is getting ready to start back on his Mobic        Allergies: No Known Allergies    Medications:   Home Medications:  Current Outpatient Prescriptions on File Prior to Visit   Medication Sig   • alosetron (LOTRONEX) 0.5 MG tablet Take 0.5 mg by mouth 2 (Two) Times a Day.   • amLODIPine (NORVASC) 10 MG tablet Take 10 mg by mouth Every Evening.   • chlorthalidone (HYGROTEN) 25 MG tablet Take 25 mg by mouth Daily.   • colestipol (COLESTID) 1 G tablet Take 1 g by mouth 3 (Three) Times a Day.   • lisinopril (PRINIVIL,ZESTRIL) 20 MG tablet Take 20 mg by mouth 2 (Two) Times a Day.   • meloxicam (MOBIC) 15 MG tablet Take 15 mg by mouth Daily. HOLD PRIOR TO SURGERY   • ondansetron (ZOFRAN) 4 MG tablet Take 1 tablet by mouth Every 8 (Eight) Hours As Needed for nausea or vomiting.   • oxyCODONE-acetaminophen (PERCOCET) 7.5-325 MG per tablet  1-2 Oral Q4H PRN severe pain   • raNITIdine (ZANTAC) 150 MG tablet Take 150 mg by mouth 2 (Two) Times a Day.   • simvastatin (ZOCOR) 40 MG tablet Take 40 mg by mouth Every Night.   • zolpidem (AMBIEN) 5 MG tablet Take 5 mg by mouth At Night As Needed.     No current facility-administered medications on file prior to visit.      Current Medications:  Scheduled Meds:  Continuous Infusions:  No current facility-administered medications for this visit.   PRN Meds:.          Physical Exam: 77 y.o. male  General Appearance:    Alert, cooperative, in no acute distress                 Vitals:    04/14/17 1017   Temp: 97.5 °F (36.4 °C)   Weight: 182 lb (82.6 kg)   Height: 67.5\" (171.5 cm)      Patient is alert and oriented ×3 no acute distress normal mood physical exam.  Physical exam of the " shoulder, incisions looked good there is no erythema,no signs or sx of infection.  Physical exam of the left shoulder reveals no overlying skin changes no lymphedema no lymphadenopathy.  Patient has active flexion 170 with mild symptoms abduction is similar external rotation is to 50 and internal rotation to the upper lumbar spine with mild symptoms.  Patient has good rotator cuff strength 4+ over 5 with isometric strength testing with pain.  Patient has a positive impingement and a positive Alvarez sign.  Patient has good cervical range of motion which is full and asymptomatic no radicular symptoms.  Patient has a normal elbow exam.  Good distal pulses are presentPatient has pain with overhead activity and a positive Neer sign and a positive empty can sign    Assessment  S/P shoulder scope.  Rotator cuff repair        Plan: Continue Physical Therapy. I reiterated restrictions I will refill his hydrocodone and I'll see him back in 6 weeks

## 2017-05-22 ENCOUNTER — OFFICE VISIT (OUTPATIENT)
Dept: CARDIOLOGY | Facility: CLINIC | Age: 77
End: 2017-05-22

## 2017-05-22 VITALS
HEART RATE: 56 BPM | BODY MASS INDEX: 28.86 KG/M2 | DIASTOLIC BLOOD PRESSURE: 73 MMHG | SYSTOLIC BLOOD PRESSURE: 121 MMHG | WEIGHT: 187 LBS

## 2017-05-22 DIAGNOSIS — I10 ESSENTIAL HYPERTENSION: Primary | ICD-10-CM

## 2017-05-22 DIAGNOSIS — E78.2 MIXED HYPERLIPIDEMIA: ICD-10-CM

## 2017-05-22 DIAGNOSIS — Z98.890 S/P ROTATOR CUFF REPAIR: ICD-10-CM

## 2017-05-22 PROCEDURE — 99213 OFFICE O/P EST LOW 20 MIN: CPT | Performed by: INTERNAL MEDICINE

## 2017-05-22 PROCEDURE — 93000 ELECTROCARDIOGRAM COMPLETE: CPT | Performed by: INTERNAL MEDICINE

## 2017-05-26 ENCOUNTER — OFFICE VISIT (OUTPATIENT)
Dept: ORTHOPEDIC SURGERY | Facility: CLINIC | Age: 77
End: 2017-05-26

## 2017-05-26 VITALS — WEIGHT: 188 LBS | BODY MASS INDEX: 29.01 KG/M2 | TEMPERATURE: 97.4 F

## 2017-05-26 DIAGNOSIS — Z98.890 S/P ROTATOR CUFF REPAIR: Primary | ICD-10-CM

## 2017-05-26 PROCEDURE — 99024 POSTOP FOLLOW-UP VISIT: CPT | Performed by: ORTHOPAEDIC SURGERY

## 2017-07-06 ENCOUNTER — OFFICE VISIT (OUTPATIENT)
Dept: ORTHOPEDIC SURGERY | Facility: CLINIC | Age: 77
End: 2017-07-06

## 2017-07-06 VITALS — HEIGHT: 68 IN | TEMPERATURE: 98 F | WEIGHT: 187.8 LBS | BODY MASS INDEX: 28.46 KG/M2

## 2017-07-06 DIAGNOSIS — Z98.890 S/P ROTATOR CUFF REPAIR: Primary | ICD-10-CM

## 2017-07-06 PROCEDURE — 99212 OFFICE O/P EST SF 10 MIN: CPT | Performed by: ORTHOPAEDIC SURGERY

## 2017-07-06 RX ORDER — FAMOTIDINE 40 MG/1
TABLET, FILM COATED ORAL
COMMUNITY
Start: 2017-07-01

## 2017-07-06 RX ORDER — MELOXICAM 15 MG/1
TABLET ORAL
COMMUNITY
Start: 2017-07-01

## 2017-07-06 NOTE — PROGRESS NOTES
"Shoulder Scope RCR follow Up       Patient: Alfie Montgomery        YOB: 1940      Chief Complaints:  left shoulder pain  Chief Complaint   Patient presents with   • Left Shoulder - Follow-up, Pain           History of Present Illness: Pt is here f/u shoulder arthroscopy, RCRHe is doing great he is only 3 months out but overall doing great happy with where he is        Allergies: No Known Allergies    Medications:   Home Medications:  Current Outpatient Prescriptions on File Prior to Visit   Medication Sig   • alosetron (LOTRONEX) 0.5 MG tablet Take 0.5 mg by mouth 2 (Two) Times a Day.   • amLODIPine (NORVASC) 10 MG tablet Take 10 mg by mouth Every Evening.   • chlorthalidone (HYGROTEN) 25 MG tablet Take 25 mg by mouth Daily.   • colestipol (COLESTID) 1 G tablet Take 1 g by mouth 3 (Three) Times a Day.   • lisinopril (PRINIVIL,ZESTRIL) 20 MG tablet Take 20 mg by mouth 2 (Two) Times a Day.   • simvastatin (ZOCOR) 40 MG tablet Take 40 mg by mouth Every Night.   • zolpidem (AMBIEN) 5 MG tablet Take 5 mg by mouth At Night As Needed.   • raNITIdine (ZANTAC) 150 MG tablet Take 150 mg by mouth 2 (Two) Times a Day.     No current facility-administered medications on file prior to visit.      Current Medications:  Scheduled Meds:  Continuous Infusions:  No current facility-administered medications for this visit.   PRN Meds:.          Physical Exam: 77 y.o. male  General Appearance:    Alert, cooperative, in no acute distress                 Vitals:    07/06/17 1027   Temp: 98 °F (36.7 °C)   TempSrc: Temporal Artery    Weight: 187 lb 12.8 oz (85.2 kg)   Height: 68\" (172.7 cm)      Patient is alert and oriented ×3 no acute distress normal mood physical exam.  Physical exam of the shoulder, incisions looked good there is no erythema,no signs or sx of infection.  Range of motion is good rotator cuff strength is quite good  Physical exam of the left shoulder reveals no overlying skin changes no lymphedema no " lymphadenopathy.  Patient has active flexion 180 with mild symptoms abduction is similar external rotation is to 50 and internal rotation to the upper lumbar spine with mild symptoms.  Patient has good rotator cuff strength 4+ over 5 with isometric strength testing with pain.  Patient has a positive impingement and a positive Alvarez sign.  Patient has good cervical range of motion which is full and asymptomatic no radicular symptoms.  Patient has a normal elbow exam.  Good distal pulses are presentPatient has pain with overhead activity and a positive Neer sign and a positive empty can sign        Assessment  S/P shoulder scope, rotator cuff repair, overall doing great he understands he still has room for improvement in the form of endurance he's cannot mind his activities watches overhead lifting and he can follow-up as needed

## 2019-04-24 ENCOUNTER — OFFICE (AMBULATORY)
Dept: URBAN - METROPOLITAN AREA CLINIC 75 | Facility: CLINIC | Age: 79
End: 2019-04-24

## 2019-04-24 VITALS
SYSTOLIC BLOOD PRESSURE: 135 MMHG | HEIGHT: 67 IN | HEART RATE: 53 BPM | DIASTOLIC BLOOD PRESSURE: 71 MMHG | WEIGHT: 195 LBS

## 2019-04-24 DIAGNOSIS — Z12.11 ENCOUNTER FOR SCREENING FOR MALIGNANT NEOPLASM OF COLON: ICD-10-CM

## 2019-04-24 DIAGNOSIS — K21.9 GASTRO-ESOPHAGEAL REFLUX DISEASE WITHOUT ESOPHAGITIS: ICD-10-CM

## 2019-04-24 DIAGNOSIS — K57.30 DIVERTICULOSIS OF LARGE INTESTINE WITHOUT PERFORATION OR ABS: ICD-10-CM

## 2019-04-24 DIAGNOSIS — K58.0 IRRITABLE BOWEL SYNDROME WITH DIARRHEA: ICD-10-CM

## 2019-04-24 DIAGNOSIS — K44.9 DIAPHRAGMATIC HERNIA WITHOUT OBSTRUCTION OR GANGRENE: ICD-10-CM

## 2019-04-24 PROCEDURE — 99213 OFFICE O/P EST LOW 20 MIN: CPT | Performed by: INTERNAL MEDICINE

## 2019-09-25 ENCOUNTER — OFFICE (AMBULATORY)
Dept: URBAN - METROPOLITAN AREA CLINIC 75 | Facility: CLINIC | Age: 79
End: 2019-09-25

## 2019-09-25 VITALS
HEART RATE: 66 BPM | HEIGHT: 67 IN | WEIGHT: 190 LBS | SYSTOLIC BLOOD PRESSURE: 127 MMHG | DIASTOLIC BLOOD PRESSURE: 64 MMHG

## 2019-09-25 DIAGNOSIS — K44.9 DIAPHRAGMATIC HERNIA WITHOUT OBSTRUCTION OR GANGRENE: ICD-10-CM

## 2019-09-25 DIAGNOSIS — K21.9 GASTRO-ESOPHAGEAL REFLUX DISEASE WITHOUT ESOPHAGITIS: ICD-10-CM

## 2019-09-25 DIAGNOSIS — K58.0 IRRITABLE BOWEL SYNDROME WITH DIARRHEA: ICD-10-CM

## 2019-09-25 DIAGNOSIS — K57.30 DIVERTICULOSIS OF LARGE INTESTINE WITHOUT PERFORATION OR ABS: ICD-10-CM

## 2019-09-25 DIAGNOSIS — Z12.11 ENCOUNTER FOR SCREENING FOR MALIGNANT NEOPLASM OF COLON: ICD-10-CM

## 2019-09-25 PROCEDURE — 99213 OFFICE O/P EST LOW 20 MIN: CPT | Performed by: INTERNAL MEDICINE

## 2020-10-01 ENCOUNTER — OFFICE (AMBULATORY)
Dept: URBAN - METROPOLITAN AREA CLINIC 75 | Facility: CLINIC | Age: 80
End: 2020-10-01

## 2020-10-01 VITALS — RESPIRATION RATE: 12 BRPM | WEIGHT: 190 LBS | TEMPERATURE: 97.5 F | HEIGHT: 67 IN

## 2020-10-01 DIAGNOSIS — K57.30 DIVERTICULOSIS OF LARGE INTESTINE WITHOUT PERFORATION OR ABS: ICD-10-CM

## 2020-10-01 DIAGNOSIS — K58.0 IRRITABLE BOWEL SYNDROME WITH DIARRHEA: ICD-10-CM

## 2020-10-01 DIAGNOSIS — Z12.11 ENCOUNTER FOR SCREENING FOR MALIGNANT NEOPLASM OF COLON: ICD-10-CM

## 2020-10-01 DIAGNOSIS — K21.9 GASTRO-ESOPHAGEAL REFLUX DISEASE WITHOUT ESOPHAGITIS: ICD-10-CM

## 2020-10-01 DIAGNOSIS — K44.9 DIAPHRAGMATIC HERNIA WITHOUT OBSTRUCTION OR GANGRENE: ICD-10-CM

## 2020-10-01 PROCEDURE — 99214 OFFICE O/P EST MOD 30 MIN: CPT | Performed by: NURSE PRACTITIONER

## 2021-01-26 ENCOUNTER — OFFICE (AMBULATORY)
Dept: URBAN - METROPOLITAN AREA CLINIC 75 | Facility: CLINIC | Age: 81
End: 2021-01-26

## 2021-01-26 VITALS — TEMPERATURE: 97.2 F | HEART RATE: 70 BPM | WEIGHT: 200 LBS | HEIGHT: 68 IN | OXYGEN SATURATION: 100 %

## 2021-01-26 DIAGNOSIS — K44.9 DIAPHRAGMATIC HERNIA WITHOUT OBSTRUCTION OR GANGRENE: ICD-10-CM

## 2021-01-26 DIAGNOSIS — K58.0 IRRITABLE BOWEL SYNDROME WITH DIARRHEA: ICD-10-CM

## 2021-01-26 DIAGNOSIS — K57.30 DIVERTICULOSIS OF LARGE INTESTINE WITHOUT PERFORATION OR ABS: ICD-10-CM

## 2021-01-26 DIAGNOSIS — K21.9 GASTRO-ESOPHAGEAL REFLUX DISEASE WITHOUT ESOPHAGITIS: ICD-10-CM

## 2021-01-26 DIAGNOSIS — R13.19 OTHER DYSPHAGIA: ICD-10-CM

## 2021-01-26 DIAGNOSIS — R13.10 DYSPHAGIA, UNSPECIFIED: ICD-10-CM

## 2021-01-26 DIAGNOSIS — Z12.11 ENCOUNTER FOR SCREENING FOR MALIGNANT NEOPLASM OF COLON: ICD-10-CM

## 2021-01-26 PROCEDURE — 99214 OFFICE O/P EST MOD 30 MIN: CPT | Performed by: NURSE PRACTITIONER

## 2021-01-26 RX ORDER — ALOSETRON HYDROCHLORIDE 0.5 MG/1
1 TABLET ORAL
Qty: 180 | Refills: 3 | Status: COMPLETED
End: 2023-01-23

## 2021-01-26 RX ORDER — FAMOTIDINE 40 MG/1
80 TABLET, FILM COATED ORAL
Qty: 180 | Refills: 4 | Status: ACTIVE

## 2021-01-26 RX ORDER — COLESTIPOL HYDROCHLORIDE 1 G/1
TABLET, FILM COATED ORAL
Qty: 270 | Refills: 3 | Status: ACTIVE

## 2021-03-09 DIAGNOSIS — Z23 IMMUNIZATION DUE: ICD-10-CM

## 2021-03-10 VITALS
DIASTOLIC BLOOD PRESSURE: 60 MMHG | HEART RATE: 48 BPM | DIASTOLIC BLOOD PRESSURE: 49 MMHG | DIASTOLIC BLOOD PRESSURE: 47 MMHG | SYSTOLIC BLOOD PRESSURE: 127 MMHG | RESPIRATION RATE: 16 BRPM | RESPIRATION RATE: 18 BRPM | DIASTOLIC BLOOD PRESSURE: 61 MMHG | RESPIRATION RATE: 14 BRPM | SYSTOLIC BLOOD PRESSURE: 105 MMHG | HEIGHT: 68 IN | HEART RATE: 60 BPM | RESPIRATION RATE: 12 BRPM | HEART RATE: 47 BPM | SYSTOLIC BLOOD PRESSURE: 101 MMHG | RESPIRATION RATE: 11 BRPM | TEMPERATURE: 96.8 F | WEIGHT: 200 LBS | TEMPERATURE: 97.5 F | RESPIRATION RATE: 4 BRPM | RESPIRATION RATE: 9 BRPM | DIASTOLIC BLOOD PRESSURE: 58 MMHG | OXYGEN SATURATION: 98 % | DIASTOLIC BLOOD PRESSURE: 64 MMHG | HEART RATE: 61 BPM | OXYGEN SATURATION: 96 % | SYSTOLIC BLOOD PRESSURE: 106 MMHG | OXYGEN SATURATION: 97 % | DIASTOLIC BLOOD PRESSURE: 55 MMHG | SYSTOLIC BLOOD PRESSURE: 133 MMHG | DIASTOLIC BLOOD PRESSURE: 53 MMHG | OXYGEN SATURATION: 99 % | DIASTOLIC BLOOD PRESSURE: 51 MMHG | SYSTOLIC BLOOD PRESSURE: 120 MMHG | HEART RATE: 51 BPM | HEART RATE: 54 BPM | SYSTOLIC BLOOD PRESSURE: 152 MMHG | HEART RATE: 56 BPM

## 2021-03-15 ENCOUNTER — OFFICE (AMBULATORY)
Dept: URBAN - METROPOLITAN AREA PATHOLOGY 4 | Facility: PATHOLOGY | Age: 81
End: 2021-03-15
Payer: MEDICARE

## 2021-03-15 ENCOUNTER — AMBULATORY SURGICAL CENTER (AMBULATORY)
Dept: URBAN - METROPOLITAN AREA SURGERY 17 | Facility: SURGERY | Age: 81
End: 2021-03-15

## 2021-03-15 DIAGNOSIS — K22.70 BARRETT'S ESOPHAGUS WITHOUT DYSPLASIA: ICD-10-CM

## 2021-03-15 DIAGNOSIS — K22.2 ESOPHAGEAL OBSTRUCTION: ICD-10-CM

## 2021-03-15 DIAGNOSIS — K31.89 OTHER DISEASES OF STOMACH AND DUODENUM: ICD-10-CM

## 2021-03-15 DIAGNOSIS — R13.10 DYSPHAGIA, UNSPECIFIED: ICD-10-CM

## 2021-03-15 DIAGNOSIS — K29.60 OTHER GASTRITIS WITHOUT BLEEDING: ICD-10-CM

## 2021-03-15 DIAGNOSIS — K44.9 DIAPHRAGMATIC HERNIA WITHOUT OBSTRUCTION OR GANGRENE: ICD-10-CM

## 2021-03-15 PROBLEM — K22.8 OTHER SPECIFIED DISEASES OF ESOPHAGUS: Status: ACTIVE | Noted: 2021-03-15

## 2021-03-15 LAB
GI HISTOLOGY: A. UNSPECIFIED: (no result)
GI HISTOLOGY: B. SELECT: (no result)
GI HISTOLOGY: PDF REPORT: (no result)

## 2021-03-15 PROCEDURE — 88342 IMHCHEM/IMCYTCHM 1ST ANTB: CPT | Performed by: INTERNAL MEDICINE

## 2021-03-15 PROCEDURE — 88305 TISSUE EXAM BY PATHOLOGIST: CPT | Performed by: INTERNAL MEDICINE

## 2021-03-15 PROCEDURE — 43249 ESOPH EGD DILATION <30 MM: CPT | Performed by: INTERNAL MEDICINE

## 2021-03-15 PROCEDURE — 43239 EGD BIOPSY SINGLE/MULTIPLE: CPT | Mod: 59 | Performed by: INTERNAL MEDICINE

## 2021-08-30 ENCOUNTER — OFFICE (AMBULATORY)
Dept: URBAN - METROPOLITAN AREA CLINIC 75 | Facility: CLINIC | Age: 81
End: 2021-08-30

## 2021-08-30 VITALS
HEIGHT: 68 IN | DIASTOLIC BLOOD PRESSURE: 62 MMHG | WEIGHT: 191 LBS | OXYGEN SATURATION: 97 % | HEART RATE: 56 BPM | SYSTOLIC BLOOD PRESSURE: 130 MMHG

## 2021-08-30 DIAGNOSIS — K22.2 ESOPHAGEAL OBSTRUCTION: ICD-10-CM

## 2021-08-30 DIAGNOSIS — K22.70 BARRETT'S ESOPHAGUS WITHOUT DYSPLASIA: ICD-10-CM

## 2021-08-30 DIAGNOSIS — K44.9 DIAPHRAGMATIC HERNIA WITHOUT OBSTRUCTION OR GANGRENE: ICD-10-CM

## 2021-08-30 DIAGNOSIS — R13.10 DYSPHAGIA, UNSPECIFIED: ICD-10-CM

## 2021-08-30 DIAGNOSIS — K21.9 GASTRO-ESOPHAGEAL REFLUX DISEASE WITHOUT ESOPHAGITIS: ICD-10-CM

## 2021-08-30 DIAGNOSIS — K58.0 IRRITABLE BOWEL SYNDROME WITH DIARRHEA: ICD-10-CM

## 2021-08-30 PROCEDURE — 99214 OFFICE O/P EST MOD 30 MIN: CPT | Performed by: NURSE PRACTITIONER

## 2021-08-30 RX ORDER — ALOSETRON HYDROCHLORIDE 0.5 MG/1
1 TABLET ORAL
Qty: 180 | Refills: 3 | Status: COMPLETED
End: 2023-01-23

## 2021-08-30 RX ORDER — FAMOTIDINE 40 MG/1
80 TABLET, FILM COATED ORAL
Qty: 180 | Refills: 4 | Status: ACTIVE

## 2021-08-30 RX ORDER — COLESTIPOL HYDROCHLORIDE 1 G/1
TABLET, FILM COATED ORAL
Qty: 270 | Refills: 3 | Status: ACTIVE

## 2023-01-23 ENCOUNTER — OFFICE (AMBULATORY)
Dept: URBAN - METROPOLITAN AREA CLINIC 76 | Facility: CLINIC | Age: 83
End: 2023-01-23

## 2023-01-23 VITALS
HEIGHT: 68 IN | WEIGHT: 198 LBS | HEART RATE: 60 BPM | DIASTOLIC BLOOD PRESSURE: 84 MMHG | SYSTOLIC BLOOD PRESSURE: 131 MMHG

## 2023-01-23 DIAGNOSIS — K22.70 BARRETT'S ESOPHAGUS WITHOUT DYSPLASIA: ICD-10-CM

## 2023-01-23 DIAGNOSIS — Z12.11 ENCOUNTER FOR SCREENING FOR MALIGNANT NEOPLASM OF COLON: ICD-10-CM

## 2023-01-23 DIAGNOSIS — K58.0 IRRITABLE BOWEL SYNDROME WITH DIARRHEA: ICD-10-CM

## 2023-01-23 DIAGNOSIS — K21.9 GASTRO-ESOPHAGEAL REFLUX DISEASE WITHOUT ESOPHAGITIS: ICD-10-CM

## 2023-01-23 PROCEDURE — 99214 OFFICE O/P EST MOD 30 MIN: CPT | Performed by: NURSE PRACTITIONER

## 2023-01-23 RX ORDER — OMEPRAZOLE 40 MG/1
CAPSULE, DELAYED RELEASE ORAL
Qty: 90 | Refills: 4 | Status: COMPLETED
Start: 2023-01-23 | End: 2024-02-12

## 2023-01-23 RX ORDER — DICYCLOMINE HYDROCHLORIDE 10 MG/1
CAPSULE ORAL
Qty: 270 | Refills: 4 | Status: ACTIVE

## 2023-01-23 RX ORDER — COLESTIPOL HYDROCHLORIDE 1 G/1
TABLET, FILM COATED ORAL
Qty: 270 | Refills: 3 | Status: ACTIVE

## 2023-03-12 VITALS
DIASTOLIC BLOOD PRESSURE: 59 MMHG | RESPIRATION RATE: 8 BRPM | OXYGEN SATURATION: 99 % | RESPIRATION RATE: 4 BRPM | SYSTOLIC BLOOD PRESSURE: 124 MMHG | SYSTOLIC BLOOD PRESSURE: 149 MMHG | RESPIRATION RATE: 12 BRPM | RESPIRATION RATE: 20 BRPM | SYSTOLIC BLOOD PRESSURE: 135 MMHG | DIASTOLIC BLOOD PRESSURE: 53 MMHG | DIASTOLIC BLOOD PRESSURE: 63 MMHG | SYSTOLIC BLOOD PRESSURE: 115 MMHG | SYSTOLIC BLOOD PRESSURE: 89 MMHG | TEMPERATURE: 97.2 F | HEART RATE: 53 BPM | WEIGHT: 191 LBS | HEART RATE: 55 BPM | HEART RATE: 47 BPM | RESPIRATION RATE: 11 BRPM | SYSTOLIC BLOOD PRESSURE: 131 MMHG | SYSTOLIC BLOOD PRESSURE: 176 MMHG | DIASTOLIC BLOOD PRESSURE: 62 MMHG | HEART RATE: 50 BPM | DIASTOLIC BLOOD PRESSURE: 61 MMHG | TEMPERATURE: 97 F | HEART RATE: 52 BPM | SYSTOLIC BLOOD PRESSURE: 91 MMHG | HEART RATE: 51 BPM | OXYGEN SATURATION: 94 % | DIASTOLIC BLOOD PRESSURE: 52 MMHG | SYSTOLIC BLOOD PRESSURE: 108 MMHG | DIASTOLIC BLOOD PRESSURE: 49 MMHG | DIASTOLIC BLOOD PRESSURE: 66 MMHG | OXYGEN SATURATION: 95 % | DIASTOLIC BLOOD PRESSURE: 57 MMHG | RESPIRATION RATE: 13 BRPM | RESPIRATION RATE: 16 BRPM | DIASTOLIC BLOOD PRESSURE: 82 MMHG | OXYGEN SATURATION: 96 % | DIASTOLIC BLOOD PRESSURE: 83 MMHG | SYSTOLIC BLOOD PRESSURE: 162 MMHG | OXYGEN SATURATION: 98 % | HEIGHT: 68 IN

## 2023-03-20 ENCOUNTER — OFFICE (AMBULATORY)
Dept: URBAN - METROPOLITAN AREA PATHOLOGY 4 | Facility: PATHOLOGY | Age: 83
End: 2023-03-20

## 2023-03-20 ENCOUNTER — AMBULATORY SURGICAL CENTER (AMBULATORY)
Dept: URBAN - METROPOLITAN AREA SURGERY 17 | Facility: SURGERY | Age: 83
End: 2023-03-20

## 2023-03-20 DIAGNOSIS — D12.2 BENIGN NEOPLASM OF ASCENDING COLON: ICD-10-CM

## 2023-03-20 DIAGNOSIS — Z12.11 ENCOUNTER FOR SCREENING FOR MALIGNANT NEOPLASM OF COLON: ICD-10-CM

## 2023-03-20 DIAGNOSIS — K63.5 POLYP OF COLON: ICD-10-CM

## 2023-03-20 DIAGNOSIS — D12.3 BENIGN NEOPLASM OF TRANSVERSE COLON: ICD-10-CM

## 2023-03-20 PROBLEM — D12.7 BENIGN NEOPLASM OF RECTOSIGMOID JUNCTION: Status: ACTIVE | Noted: 2023-03-20

## 2023-03-20 LAB
GI HISTOLOGY: A. UNSPECIFIED: (no result)
GI HISTOLOGY: B. UNSPECIFIED: (no result)
GI HISTOLOGY: C. SELECT: (no result)
GI HISTOLOGY: PDF REPORT: (no result)

## 2023-03-20 PROCEDURE — 45380 COLONOSCOPY AND BIOPSY: CPT | Mod: PT,59 | Performed by: INTERNAL MEDICINE

## 2023-03-20 PROCEDURE — 88305 TISSUE EXAM BY PATHOLOGIST: CPT | Performed by: INTERNAL MEDICINE

## 2023-03-20 PROCEDURE — 45385 COLONOSCOPY W/LESION REMOVAL: CPT | Mod: PT | Performed by: INTERNAL MEDICINE

## 2023-03-20 PROCEDURE — 45380 COLONOSCOPY AND BIOPSY: CPT | Mod: 59,PT | Performed by: INTERNAL MEDICINE

## 2023-07-17 ENCOUNTER — OFFICE (AMBULATORY)
Dept: URBAN - METROPOLITAN AREA CLINIC 76 | Facility: CLINIC | Age: 83
End: 2023-07-17

## 2023-07-17 VITALS
WEIGHT: 206 LBS | DIASTOLIC BLOOD PRESSURE: 73 MMHG | HEIGHT: 68 IN | OXYGEN SATURATION: 98 % | SYSTOLIC BLOOD PRESSURE: 132 MMHG | HEART RATE: 57 BPM

## 2023-07-17 DIAGNOSIS — K58.0 IRRITABLE BOWEL SYNDROME WITH DIARRHEA: ICD-10-CM

## 2023-07-17 DIAGNOSIS — K22.70 BARRETT'S ESOPHAGUS WITHOUT DYSPLASIA: ICD-10-CM

## 2023-07-17 DIAGNOSIS — K63.5 POLYP OF COLON: ICD-10-CM

## 2023-07-17 DIAGNOSIS — K21.00 GASTRO-ESOPHAGEAL REFLUX DISEASE WITH ESOPHAGITIS, WITHOUT B: ICD-10-CM

## 2023-07-17 PROCEDURE — 99214 OFFICE O/P EST MOD 30 MIN: CPT | Performed by: NURSE PRACTITIONER

## 2023-07-17 RX ORDER — OMEPRAZOLE 40 MG/1
CAPSULE, DELAYED RELEASE ORAL
Qty: 90 | Refills: 4 | Status: COMPLETED
Start: 2023-01-23 | End: 2024-02-12

## 2023-07-17 RX ORDER — COLESTIPOL HYDROCHLORIDE 1 G/1
TABLET, FILM COATED ORAL
Qty: 270 | Refills: 3 | Status: ACTIVE

## 2023-08-05 NOTE — ANESTHESIA PROCEDURE NOTES
Airway  Urgency: elective    Date/Time: 3/6/2017 8:19 AM  Airway not difficult    General Information and Staff    Patient location during procedure: OR  Anesthesiologist: ROSMERY BARBOSA  CRNA: ELENA HUDDLESTON    Indications and Patient Condition  Indications for airway management: airway protection    Preoxygenated: yes  MILS not maintained throughout  Mask difficulty assessment: 0 - not attempted    Final Airway Details  Final airway type: supraglottic airway      Successful airway: classic  Size 5    Number of attempts at approach: 1    Additional Comments  Atraumatic             Yes

## 2024-02-09 VITALS
HEART RATE: 49 BPM | WEIGHT: 186 LBS | SYSTOLIC BLOOD PRESSURE: 137 MMHG | OXYGEN SATURATION: 95 % | HEIGHT: 68 IN | DIASTOLIC BLOOD PRESSURE: 64 MMHG

## 2024-02-12 ENCOUNTER — OFFICE (AMBULATORY)
Dept: URBAN - METROPOLITAN AREA CLINIC 76 | Facility: CLINIC | Age: 84
End: 2024-02-12

## 2024-02-12 DIAGNOSIS — Z12.11 ENCOUNTER FOR SCREENING FOR MALIGNANT NEOPLASM OF COLON: ICD-10-CM

## 2024-02-12 DIAGNOSIS — K44.9 DIAPHRAGMATIC HERNIA WITHOUT OBSTRUCTION OR GANGRENE: ICD-10-CM

## 2024-02-12 DIAGNOSIS — K22.70 BARRETT'S ESOPHAGUS WITHOUT DYSPLASIA: ICD-10-CM

## 2024-02-12 DIAGNOSIS — R13.10 DYSPHAGIA, UNSPECIFIED: ICD-10-CM

## 2024-02-12 DIAGNOSIS — Z79.01 LONG TERM (CURRENT) USE OF ANTICOAGULANTS: ICD-10-CM

## 2024-02-12 DIAGNOSIS — K57.30 DIVERTICULOSIS OF LARGE INTESTINE WITHOUT PERFORATION OR ABS: ICD-10-CM

## 2024-02-12 DIAGNOSIS — K22.2 ESOPHAGEAL OBSTRUCTION: ICD-10-CM

## 2024-02-12 DIAGNOSIS — K58.0 IRRITABLE BOWEL SYNDROME WITH DIARRHEA: ICD-10-CM

## 2024-02-12 DIAGNOSIS — Z85.46 PERSONAL HISTORY OF MALIGNANT NEOPLASM OF PROSTATE: ICD-10-CM

## 2024-02-12 DIAGNOSIS — K21.9 GASTRO-ESOPHAGEAL REFLUX DISEASE WITHOUT ESOPHAGITIS: ICD-10-CM

## 2024-02-12 PROCEDURE — 99214 OFFICE O/P EST MOD 30 MIN: CPT | Performed by: INTERNAL MEDICINE

## 2024-03-13 VITALS
OXYGEN SATURATION: 96 % | SYSTOLIC BLOOD PRESSURE: 173 MMHG | RESPIRATION RATE: 11 BRPM | SYSTOLIC BLOOD PRESSURE: 113 MMHG | RESPIRATION RATE: 10 BRPM | DIASTOLIC BLOOD PRESSURE: 48 MMHG | TEMPERATURE: 97.5 F | HEART RATE: 52 BPM | RESPIRATION RATE: 16 BRPM | DIASTOLIC BLOOD PRESSURE: 63 MMHG | OXYGEN SATURATION: 89 % | DIASTOLIC BLOOD PRESSURE: 61 MMHG | RESPIRATION RATE: 15 BRPM | DIASTOLIC BLOOD PRESSURE: 70 MMHG | HEART RATE: 58 BPM | DIASTOLIC BLOOD PRESSURE: 62 MMHG | TEMPERATURE: 96.2 F | OXYGEN SATURATION: 97 % | RESPIRATION RATE: 18 BRPM | HEART RATE: 47 BPM | HEART RATE: 53 BPM | SYSTOLIC BLOOD PRESSURE: 110 MMHG | HEART RATE: 48 BPM | WEIGHT: 186 LBS | SYSTOLIC BLOOD PRESSURE: 125 MMHG | HEIGHT: 68 IN | SYSTOLIC BLOOD PRESSURE: 148 MMHG | SYSTOLIC BLOOD PRESSURE: 119 MMHG | HEART RATE: 50 BPM | OXYGEN SATURATION: 98 % | HEART RATE: 55 BPM | DIASTOLIC BLOOD PRESSURE: 53 MMHG

## 2024-03-18 ENCOUNTER — OFFICE (AMBULATORY)
Dept: URBAN - METROPOLITAN AREA PATHOLOGY 4 | Facility: PATHOLOGY | Age: 84
End: 2024-03-18
Payer: MEDICARE

## 2024-03-18 ENCOUNTER — AMBULATORY SURGICAL CENTER (AMBULATORY)
Dept: URBAN - METROPOLITAN AREA SURGERY 17 | Facility: SURGERY | Age: 84
End: 2024-03-18

## 2024-03-18 DIAGNOSIS — K44.9 DIAPHRAGMATIC HERNIA WITHOUT OBSTRUCTION OR GANGRENE: ICD-10-CM

## 2024-03-18 DIAGNOSIS — K21.9 GASTRO-ESOPHAGEAL REFLUX DISEASE WITHOUT ESOPHAGITIS: ICD-10-CM

## 2024-03-18 DIAGNOSIS — K22.70 BARRETT'S ESOPHAGUS WITHOUT DYSPLASIA: ICD-10-CM

## 2024-03-18 DIAGNOSIS — K21.00 GASTRO-ESOPHAGEAL REFLUX DISEASE WITH ESOPHAGITIS, WITHOUT B: ICD-10-CM

## 2024-03-18 PROBLEM — K22.89 OTHER SPECIFIED DISEASE OF ESOPHAGUS: Status: ACTIVE | Noted: 2024-03-18

## 2024-03-18 LAB
GI HISTOLOGY: PDF REPORT: (no result)
Lab: (no result)

## 2024-03-18 PROCEDURE — 43239 EGD BIOPSY SINGLE/MULTIPLE: CPT | Performed by: INTERNAL MEDICINE

## 2024-03-18 PROCEDURE — 88305 TISSUE EXAM BY PATHOLOGIST: CPT | Performed by: PATHOLOGY

## 2024-07-01 ENCOUNTER — OFFICE (AMBULATORY)
Age: 84
End: 2024-07-01

## 2024-07-01 ENCOUNTER — OFFICE (AMBULATORY)
Dept: URBAN - METROPOLITAN AREA CLINIC 76 | Facility: CLINIC | Age: 84
End: 2024-07-01

## 2024-07-31 ENCOUNTER — OFFICE (AMBULATORY)
Dept: URBAN - METROPOLITAN AREA CLINIC 76 | Facility: CLINIC | Age: 84
End: 2024-07-31
Payer: MEDICARE

## 2024-07-31 DIAGNOSIS — K21.9 GASTRO-ESOPHAGEAL REFLUX DISEASE WITHOUT ESOPHAGITIS: ICD-10-CM

## 2024-07-31 DIAGNOSIS — K58.0 IRRITABLE BOWEL SYNDROME WITH DIARRHEA: ICD-10-CM

## 2024-07-31 DIAGNOSIS — K22.70 BARRETT'S ESOPHAGUS WITHOUT DYSPLASIA: ICD-10-CM

## 2024-07-31 PROCEDURE — 99490 CHRNC CARE MGMT STAFF 1ST 20: CPT | Performed by: INTERNAL MEDICINE

## 2024-07-31 PROCEDURE — 99439 CHRNC CARE MGMT STAF EA ADDL: CPT | Performed by: INTERNAL MEDICINE

## 2024-08-31 ENCOUNTER — OFFICE (AMBULATORY)
Age: 84
End: 2024-08-31
Payer: MEDICARE

## 2024-08-31 ENCOUNTER — OFFICE (AMBULATORY)
Dept: URBAN - METROPOLITAN AREA CLINIC 76 | Facility: CLINIC | Age: 84
End: 2024-08-31
Payer: MEDICARE

## 2024-08-31 DIAGNOSIS — K21.9 GASTRO-ESOPHAGEAL REFLUX DISEASE WITHOUT ESOPHAGITIS: ICD-10-CM

## 2024-08-31 DIAGNOSIS — K57.30 DIVERTICULOSIS OF LARGE INTESTINE WITHOUT PERFORATION OR ABS: ICD-10-CM

## 2024-08-31 DIAGNOSIS — K22.2 ESOPHAGEAL OBSTRUCTION: ICD-10-CM

## 2024-08-31 DIAGNOSIS — K58.0 IRRITABLE BOWEL SYNDROME WITH DIARRHEA: ICD-10-CM

## 2024-08-31 DIAGNOSIS — K22.70 BARRETT'S ESOPHAGUS WITHOUT DYSPLASIA: ICD-10-CM

## 2024-08-31 PROCEDURE — 99490 CHRNC CARE MGMT STAFF 1ST 20: CPT | Performed by: INTERNAL MEDICINE

## 2024-09-30 ENCOUNTER — OFFICE (AMBULATORY)
Age: 84
End: 2024-09-30
Payer: MEDICARE

## 2024-09-30 ENCOUNTER — OFFICE (AMBULATORY)
Dept: URBAN - METROPOLITAN AREA CLINIC 76 | Facility: CLINIC | Age: 84
End: 2024-09-30
Payer: MEDICARE

## 2024-09-30 DIAGNOSIS — K57.30 DIVERTICULOSIS OF LARGE INTESTINE WITHOUT PERFORATION OR ABS: ICD-10-CM

## 2024-09-30 DIAGNOSIS — K22.2 ESOPHAGEAL OBSTRUCTION: ICD-10-CM

## 2024-09-30 DIAGNOSIS — K58.0 IRRITABLE BOWEL SYNDROME WITH DIARRHEA: ICD-10-CM

## 2024-09-30 DIAGNOSIS — K21.9 GASTRO-ESOPHAGEAL REFLUX DISEASE WITHOUT ESOPHAGITIS: ICD-10-CM

## 2024-09-30 DIAGNOSIS — K22.70 BARRETT'S ESOPHAGUS WITHOUT DYSPLASIA: ICD-10-CM

## 2024-09-30 PROCEDURE — 99490 CHRNC CARE MGMT STAFF 1ST 20: CPT | Performed by: INTERNAL MEDICINE

## 2024-10-31 ENCOUNTER — OFFICE (AMBULATORY)
Age: 84
End: 2024-10-31
Payer: MEDICARE

## 2024-10-31 ENCOUNTER — OFFICE (AMBULATORY)
Dept: URBAN - METROPOLITAN AREA CLINIC 76 | Facility: CLINIC | Age: 84
End: 2024-10-31
Payer: MEDICARE

## 2024-10-31 DIAGNOSIS — K58.0 IRRITABLE BOWEL SYNDROME WITH DIARRHEA: ICD-10-CM

## 2024-10-31 DIAGNOSIS — K22.70 BARRETT'S ESOPHAGUS WITHOUT DYSPLASIA: ICD-10-CM

## 2024-10-31 DIAGNOSIS — K21.9 GASTRO-ESOPHAGEAL REFLUX DISEASE WITHOUT ESOPHAGITIS: ICD-10-CM

## 2024-10-31 DIAGNOSIS — K57.30 DIVERTICULOSIS OF LARGE INTESTINE WITHOUT PERFORATION OR ABS: ICD-10-CM

## 2024-10-31 DIAGNOSIS — K22.2 ESOPHAGEAL OBSTRUCTION: ICD-10-CM

## 2024-10-31 PROCEDURE — 99490 CHRNC CARE MGMT STAFF 1ST 20: CPT | Performed by: INTERNAL MEDICINE

## 2024-11-30 ENCOUNTER — OFFICE (AMBULATORY)
Dept: URBAN - METROPOLITAN AREA CLINIC 76 | Facility: CLINIC | Age: 84
End: 2024-11-30
Payer: MEDICARE

## 2024-11-30 DIAGNOSIS — K21.9 GASTRO-ESOPHAGEAL REFLUX DISEASE WITHOUT ESOPHAGITIS: ICD-10-CM

## 2024-11-30 DIAGNOSIS — K58.0 IRRITABLE BOWEL SYNDROME WITH DIARRHEA: ICD-10-CM

## 2024-11-30 DIAGNOSIS — K22.2 ESOPHAGEAL OBSTRUCTION: ICD-10-CM

## 2024-11-30 DIAGNOSIS — K22.70 BARRETT'S ESOPHAGUS WITHOUT DYSPLASIA: ICD-10-CM

## 2024-11-30 DIAGNOSIS — K57.30 DIVERTICULOSIS OF LARGE INTESTINE WITHOUT PERFORATION OR ABS: ICD-10-CM

## 2024-11-30 PROCEDURE — 99490 CHRNC CARE MGMT STAFF 1ST 20: CPT | Performed by: INTERNAL MEDICINE

## 2024-12-31 ENCOUNTER — OFFICE (AMBULATORY)
Dept: URBAN - METROPOLITAN AREA CLINIC 76 | Facility: CLINIC | Age: 84
End: 2024-12-31
Payer: MEDICARE

## 2024-12-31 DIAGNOSIS — K22.2 ESOPHAGEAL OBSTRUCTION: ICD-10-CM

## 2024-12-31 DIAGNOSIS — K57.30 DIVERTICULOSIS OF LARGE INTESTINE WITHOUT PERFORATION OR ABS: ICD-10-CM

## 2024-12-31 DIAGNOSIS — K58.0 IRRITABLE BOWEL SYNDROME WITH DIARRHEA: ICD-10-CM

## 2024-12-31 DIAGNOSIS — K21.9 GASTRO-ESOPHAGEAL REFLUX DISEASE WITHOUT ESOPHAGITIS: ICD-10-CM

## 2024-12-31 DIAGNOSIS — K22.70 BARRETT'S ESOPHAGUS WITHOUT DYSPLASIA: ICD-10-CM

## 2024-12-31 PROCEDURE — 99490 CHRNC CARE MGMT STAFF 1ST 20: CPT | Performed by: INTERNAL MEDICINE

## 2025-02-28 ENCOUNTER — OFFICE (AMBULATORY)
Dept: URBAN - METROPOLITAN AREA CLINIC 76 | Facility: CLINIC | Age: 85
End: 2025-02-28
Payer: MEDICARE

## 2025-02-28 DIAGNOSIS — K58.0 IRRITABLE BOWEL SYNDROME WITH DIARRHEA: ICD-10-CM

## 2025-02-28 DIAGNOSIS — K57.30 DIVERTICULOSIS OF LARGE INTESTINE WITHOUT PERFORATION OR ABS: ICD-10-CM

## 2025-02-28 DIAGNOSIS — K21.9 GASTRO-ESOPHAGEAL REFLUX DISEASE WITHOUT ESOPHAGITIS: ICD-10-CM

## 2025-02-28 DIAGNOSIS — K22.2 ESOPHAGEAL OBSTRUCTION: ICD-10-CM

## 2025-02-28 DIAGNOSIS — K22.70 BARRETT'S ESOPHAGUS WITHOUT DYSPLASIA: ICD-10-CM

## 2025-02-28 PROCEDURE — 99439 CHRNC CARE MGMT STAF EA ADDL: CPT | Performed by: INTERNAL MEDICINE

## 2025-02-28 PROCEDURE — 99490 CHRNC CARE MGMT STAFF 1ST 20: CPT | Performed by: INTERNAL MEDICINE

## 2025-03-28 ENCOUNTER — OFFICE (AMBULATORY)
Dept: URBAN - METROPOLITAN AREA CLINIC 76 | Facility: CLINIC | Age: 85
End: 2025-03-28
Payer: MEDICARE

## 2025-03-28 VITALS
SYSTOLIC BLOOD PRESSURE: 157 MMHG | HEIGHT: 68 IN | SYSTOLIC BLOOD PRESSURE: 138 MMHG | DIASTOLIC BLOOD PRESSURE: 91 MMHG | WEIGHT: 196 LBS | DIASTOLIC BLOOD PRESSURE: 78 MMHG

## 2025-03-28 DIAGNOSIS — K58.0 IRRITABLE BOWEL SYNDROME WITH DIARRHEA: ICD-10-CM

## 2025-03-28 DIAGNOSIS — K22.70 BARRETT'S ESOPHAGUS WITHOUT DYSPLASIA: ICD-10-CM

## 2025-03-28 DIAGNOSIS — K21.9 GASTRO-ESOPHAGEAL REFLUX DISEASE WITHOUT ESOPHAGITIS: ICD-10-CM

## 2025-03-28 PROCEDURE — 99214 OFFICE O/P EST MOD 30 MIN: CPT

## 2025-03-28 RX ORDER — DICYCLOMINE HYDROCHLORIDE 10 MG/1
CAPSULE ORAL
Qty: 270 | Refills: 4 | Status: ACTIVE

## 2025-04-30 ENCOUNTER — OFFICE (AMBULATORY)
Dept: URBAN - METROPOLITAN AREA CLINIC 76 | Facility: CLINIC | Age: 85
End: 2025-04-30
Payer: MEDICARE

## 2025-04-30 DIAGNOSIS — K22.70 BARRETT'S ESOPHAGUS WITHOUT DYSPLASIA: ICD-10-CM

## 2025-04-30 DIAGNOSIS — K21.9 GASTRO-ESOPHAGEAL REFLUX DISEASE WITHOUT ESOPHAGITIS: ICD-10-CM

## 2025-04-30 DIAGNOSIS — K58.0 IRRITABLE BOWEL SYNDROME WITH DIARRHEA: ICD-10-CM

## 2025-04-30 DIAGNOSIS — K57.30 DIVERTICULOSIS OF LARGE INTESTINE WITHOUT PERFORATION OR ABS: ICD-10-CM

## 2025-04-30 DIAGNOSIS — K22.2 ESOPHAGEAL OBSTRUCTION: ICD-10-CM

## 2025-04-30 PROCEDURE — 99490 CHRNC CARE MGMT STAFF 1ST 20: CPT | Performed by: INTERNAL MEDICINE

## 2025-07-31 ENCOUNTER — OFFICE (AMBULATORY)
Dept: URBAN - METROPOLITAN AREA CLINIC 76 | Facility: CLINIC | Age: 85
End: 2025-07-31
Payer: MEDICARE

## 2025-07-31 DIAGNOSIS — K58.0 IRRITABLE BOWEL SYNDROME WITH DIARRHEA: ICD-10-CM

## 2025-07-31 DIAGNOSIS — K57.30 DIVERTICULOSIS OF LARGE INTESTINE WITHOUT PERFORATION OR ABS: ICD-10-CM

## 2025-07-31 DIAGNOSIS — K22.70 BARRETT'S ESOPHAGUS WITHOUT DYSPLASIA: ICD-10-CM

## 2025-07-31 DIAGNOSIS — K21.9 GASTRO-ESOPHAGEAL REFLUX DISEASE WITHOUT ESOPHAGITIS: ICD-10-CM

## 2025-07-31 DIAGNOSIS — K22.2 ESOPHAGEAL OBSTRUCTION: ICD-10-CM

## 2025-07-31 PROCEDURE — 99490 CHRNC CARE MGMT STAFF 1ST 20: CPT | Performed by: INTERNAL MEDICINE

## (undated) DEVICE — TBG PUMP ARTHSCP MAIN AR6400 16FT

## (undated) DEVICE — KT POSTN ARM TRIMANO BEACH CHR W/DRP

## (undated) DEVICE — INTENDED FOR TISSUE SEPARATION, AND OTHER PROCEDURES THAT REQUIRE A SHARP SURGICAL BLADE TO PUNCTURE OR CUT.: Brand: BARD-PARKER ® CARBON RIB-BACK BLADES

## (undated) DEVICE — PK ARTHSCP SHLDR TOWER 40

## (undated) DEVICE — IRRIGATOR BULB ASEPTO 60CC STRL

## (undated) DEVICE — GLV SURG SENSICARE GREEN W/ALOE PF LF 6.5 STRL

## (undated) DEVICE — PENCL E/S HNDSWCH ROCKR CB

## (undated) DEVICE — BUR RND COOL CUT 8FLUT 4MM 13CM

## (undated) DEVICE — GLV SURG BIOGEL LTX PF 6 1/2

## (undated) DEVICE — TP NDL SCORPION MULTIFIRE

## (undated) DEVICE — INTENT TO BE USED WITH SUTURE MATERIAL FOR TISSUE CLOSURE: Brand: RICHARD-ALLAN® NEEDLE 1/2 CIRCLE TAPER

## (undated) DEVICE — DRSNG WND GZ CURAD OIL EMULSION 3X3IN STRL

## (undated) DEVICE — SKIN PREP TRAY W/CHG: Brand: MEDLINE INDUSTRIES, INC.

## (undated) DEVICE — ABL APOLLO RF M/PRT 90D